# Patient Record
Sex: FEMALE | Race: WHITE | NOT HISPANIC OR LATINO | Employment: OTHER | URBAN - METROPOLITAN AREA
[De-identification: names, ages, dates, MRNs, and addresses within clinical notes are randomized per-mention and may not be internally consistent; named-entity substitution may affect disease eponyms.]

---

## 2017-08-21 NOTE — PATIENT DISCUSSION
Pt was here today afor a Second opinion. [place] discussed her need for cataract surgery to improve her vision at this time. She will follow-up with Dr. Sharonda Anthony. Ricardo Tatuming she returns to [facility], I have recommended [address].

## 2022-01-27 ENCOUNTER — NEW PATIENT (OUTPATIENT)
Dept: URBAN - METROPOLITAN AREA CLINIC 38 | Facility: CLINIC | Age: 87
End: 2022-01-27

## 2022-01-27 DIAGNOSIS — Z96.1: ICD-10-CM

## 2022-01-27 DIAGNOSIS — H52.7: ICD-10-CM

## 2022-01-27 DIAGNOSIS — H02.88B: ICD-10-CM

## 2022-01-27 DIAGNOSIS — H04.123: ICD-10-CM

## 2022-01-27 DIAGNOSIS — H43.813: ICD-10-CM

## 2022-01-27 DIAGNOSIS — H02.88A: ICD-10-CM

## 2022-01-27 PROCEDURE — 92015 DETERMINE REFRACTIVE STATE: CPT

## 2022-01-27 PROCEDURE — 99204 OFFICE O/P NEW MOD 45 MIN: CPT

## 2022-01-27 RX ORDER — OLOPATADINE HYDROCHLORIDE 2 MG/ML: 1 SOLUTION OPHTHALMIC ONCE A DAY

## 2022-01-27 ASSESSMENT — VISUAL ACUITY
OD_CC: J1
OU_CC: J1+
OD_CC: 20/40+1
OD_SC: J3
OU_SC: 20/25-2
OU_CC: 20/25
OU_SC: J3
OD_SC: 20/25+2

## 2022-01-27 ASSESSMENT — TONOMETRY
OD_IOP_MMHG: 20
OS_IOP_MMHG: 19

## 2022-01-27 NOTE — PATIENT DISCUSSION
Since she was born. Corrected with surgery. Advised to wear polycarbonate lenses full time. Will get records release.

## 2023-01-30 ENCOUNTER — ESTABLISHED PATIENT (OUTPATIENT)
Dept: URBAN - METROPOLITAN AREA CLINIC 38 | Facility: CLINIC | Age: 88
End: 2023-01-30

## 2023-01-30 DIAGNOSIS — H53.032: ICD-10-CM

## 2023-01-30 DIAGNOSIS — H04.123: ICD-10-CM

## 2023-01-30 DIAGNOSIS — Z96.1: ICD-10-CM

## 2023-01-30 DIAGNOSIS — H43.813: ICD-10-CM

## 2023-01-30 DIAGNOSIS — H02.88B: ICD-10-CM

## 2023-01-30 DIAGNOSIS — H02.88A: ICD-10-CM

## 2023-01-30 PROCEDURE — 92014 COMPRE OPH EXAM EST PT 1/>: CPT

## 2023-01-30 ASSESSMENT — TONOMETRY
OD_IOP_MMHG: 16
OS_IOP_MMHG: 15

## 2023-01-30 ASSESSMENT — VISUAL ACUITY
OU_CC: J1+
OU_CC: 20/25+1
OD_CC: 20/25+1
OD_CC: J1+

## 2024-01-30 ENCOUNTER — COMPREHENSIVE EXAM (OUTPATIENT)
Dept: URBAN - METROPOLITAN AREA CLINIC 38 | Facility: CLINIC | Age: 89
End: 2024-01-30

## 2024-01-30 DIAGNOSIS — H04.123: ICD-10-CM

## 2024-01-30 DIAGNOSIS — H02.88B: ICD-10-CM

## 2024-01-30 DIAGNOSIS — Z96.1: ICD-10-CM

## 2024-01-30 DIAGNOSIS — H52.7: ICD-10-CM

## 2024-01-30 DIAGNOSIS — H35.3131: ICD-10-CM

## 2024-01-30 DIAGNOSIS — H43.813: ICD-10-CM

## 2024-01-30 DIAGNOSIS — H02.88A: ICD-10-CM

## 2024-01-30 DIAGNOSIS — H53.032: ICD-10-CM

## 2024-01-30 PROCEDURE — 92134 CPTRZ OPH DX IMG PST SGM RTA: CPT

## 2024-01-30 PROCEDURE — 92015 DETERMINE REFRACTIVE STATE: CPT

## 2024-01-30 PROCEDURE — 92014 COMPRE OPH EXAM EST PT 1/>: CPT

## 2024-01-30 ASSESSMENT — VISUAL ACUITY
OD_CC: J2
OU_CC: J2
OD_CC: 20/30
OU_CC: 20/30

## 2024-01-30 ASSESSMENT — TONOMETRY
OD_IOP_MMHG: 15
OS_IOP_MMHG: 15

## 2024-02-08 ENCOUNTER — EMERGENCY VISIT (OUTPATIENT)
Dept: URBAN - METROPOLITAN AREA CLINIC 38 | Facility: CLINIC | Age: 89
End: 2024-02-08

## 2024-02-08 DIAGNOSIS — H53.2: ICD-10-CM

## 2024-02-08 DIAGNOSIS — H53.032: ICD-10-CM

## 2024-02-08 DIAGNOSIS — H04.123: ICD-10-CM

## 2024-02-08 DIAGNOSIS — H43.813: ICD-10-CM

## 2024-02-08 DIAGNOSIS — H35.3131: ICD-10-CM

## 2024-02-08 DIAGNOSIS — Z96.1: ICD-10-CM

## 2024-02-08 DIAGNOSIS — H02.88A: ICD-10-CM

## 2024-02-08 DIAGNOSIS — H35.371: ICD-10-CM

## 2024-02-08 DIAGNOSIS — H02.88B: ICD-10-CM

## 2024-02-08 PROCEDURE — 99213 OFFICE O/P EST LOW 20 MIN: CPT

## 2024-02-08 ASSESSMENT — VISUAL ACUITY
OD_CC: 20/50
OU_CC: 20/50

## 2024-02-08 ASSESSMENT — TONOMETRY
OS_IOP_MMHG: 15
OD_IOP_MMHG: 16

## 2024-02-27 ENCOUNTER — CONSULTATION/EVALUATION (OUTPATIENT)
Dept: URBAN - METROPOLITAN AREA CLINIC 39 | Facility: CLINIC | Age: 89
End: 2024-02-27

## 2024-02-27 DIAGNOSIS — H53.032: ICD-10-CM

## 2024-02-27 DIAGNOSIS — H35.30: ICD-10-CM

## 2024-02-27 DIAGNOSIS — H35.721: ICD-10-CM

## 2024-02-27 DIAGNOSIS — Z96.1: ICD-10-CM

## 2024-02-27 DIAGNOSIS — H43.813: ICD-10-CM

## 2024-02-27 DIAGNOSIS — H35.371: ICD-10-CM

## 2024-02-27 DIAGNOSIS — H35.732: ICD-10-CM

## 2024-02-27 DIAGNOSIS — H35.3112: ICD-10-CM

## 2024-02-27 DIAGNOSIS — H35.3221: ICD-10-CM

## 2024-02-27 DIAGNOSIS — H53.2: ICD-10-CM

## 2024-02-27 DIAGNOSIS — H04.123: ICD-10-CM

## 2024-02-27 DIAGNOSIS — H33.101: ICD-10-CM

## 2024-02-27 DIAGNOSIS — H35.363: ICD-10-CM

## 2024-02-27 PROCEDURE — 99214 OFFICE O/P EST MOD 30 MIN: CPT

## 2024-02-27 PROCEDURE — 92250 FUNDUS PHOTOGRAPHY W/I&R: CPT

## 2024-02-27 PROCEDURE — 92235 FLUORESCEIN ANGRPH MLTIFRAME: CPT

## 2024-02-27 PROCEDURE — 76512 OPH US DX B-SCAN: CPT

## 2024-02-27 ASSESSMENT — TONOMETRY
OS_IOP_MMHG: 9
OD_IOP_MMHG: 10

## 2024-02-27 ASSESSMENT — VISUAL ACUITY: OD_CC: 20/25

## 2024-08-27 ENCOUNTER — COMPREHENSIVE EXAM (OUTPATIENT)
Dept: URBAN - METROPOLITAN AREA CLINIC 39 | Facility: CLINIC | Age: 89
End: 2024-08-27

## 2024-08-27 DIAGNOSIS — H35.732: ICD-10-CM

## 2024-08-27 DIAGNOSIS — H35.3221: ICD-10-CM

## 2024-08-27 DIAGNOSIS — H35.363: ICD-10-CM

## 2024-08-27 DIAGNOSIS — H35.371: ICD-10-CM

## 2024-08-27 DIAGNOSIS — H35.3112: ICD-10-CM

## 2024-08-27 DIAGNOSIS — H35.721: ICD-10-CM

## 2024-08-27 DIAGNOSIS — H33.101: ICD-10-CM

## 2024-08-27 DIAGNOSIS — H35.30: ICD-10-CM

## 2024-08-27 PROCEDURE — 92242 FLUORESCEIN&ICG ANGIOGRAPHY: CPT

## 2024-08-27 PROCEDURE — 92134 CPTRZ OPH DX IMG PST SGM RTA: CPT

## 2024-08-27 PROCEDURE — 99214 OFFICE O/P EST MOD 30 MIN: CPT

## 2024-08-27 ASSESSMENT — VISUAL ACUITY
OS_CC: CF 3FT
OD_PH: 20/30-2
OD_CC: 20/60

## 2024-08-27 ASSESSMENT — TONOMETRY
OD_IOP_MMHG: 16
OS_IOP_MMHG: 13

## 2025-02-06 ENCOUNTER — COMPREHENSIVE EXAM (OUTPATIENT)
Age: OVER 89
End: 2025-02-06

## 2025-02-06 DIAGNOSIS — H35.363: ICD-10-CM

## 2025-02-06 DIAGNOSIS — H02.88B: ICD-10-CM

## 2025-02-06 DIAGNOSIS — Z96.1: ICD-10-CM

## 2025-02-06 DIAGNOSIS — H04.123: ICD-10-CM

## 2025-02-06 DIAGNOSIS — H35.30: ICD-10-CM

## 2025-02-06 DIAGNOSIS — H02.88A: ICD-10-CM

## 2025-02-06 DIAGNOSIS — H35.732: ICD-10-CM

## 2025-02-06 DIAGNOSIS — H35.3221: ICD-10-CM

## 2025-02-06 DIAGNOSIS — H43.813: ICD-10-CM

## 2025-02-06 DIAGNOSIS — H35.3112: ICD-10-CM

## 2025-02-06 DIAGNOSIS — H35.371: ICD-10-CM

## 2025-02-06 DIAGNOSIS — H53.032: ICD-10-CM

## 2025-02-06 DIAGNOSIS — H33.101: ICD-10-CM

## 2025-02-06 DIAGNOSIS — H52.7: ICD-10-CM

## 2025-02-06 DIAGNOSIS — H35.721: ICD-10-CM

## 2025-02-06 PROCEDURE — 92015 DETERMINE REFRACTIVE STATE: CPT

## 2025-02-06 PROCEDURE — 92014 COMPRE OPH EXAM EST PT 1/>: CPT

## 2025-08-06 ENCOUNTER — COMPREHENSIVE EXAM (OUTPATIENT)
Age: OVER 89
End: 2025-08-06

## 2025-08-06 DIAGNOSIS — H35.3112: ICD-10-CM

## 2025-08-06 DIAGNOSIS — H52.7: ICD-10-CM

## 2025-08-06 DIAGNOSIS — H35.732: ICD-10-CM

## 2025-08-06 DIAGNOSIS — H35.721: ICD-10-CM

## 2025-08-06 DIAGNOSIS — H02.88A: ICD-10-CM

## 2025-08-06 DIAGNOSIS — H33.101: ICD-10-CM

## 2025-08-06 DIAGNOSIS — H35.363: ICD-10-CM

## 2025-08-06 DIAGNOSIS — H53.032: ICD-10-CM

## 2025-08-06 DIAGNOSIS — Z96.1: ICD-10-CM

## 2025-08-06 DIAGNOSIS — H35.3221: ICD-10-CM

## 2025-08-06 DIAGNOSIS — H04.123: ICD-10-CM

## 2025-08-06 DIAGNOSIS — H35.30: ICD-10-CM

## 2025-08-06 DIAGNOSIS — H02.88B: ICD-10-CM

## 2025-08-06 DIAGNOSIS — H35.371: ICD-10-CM

## 2025-08-06 DIAGNOSIS — H43.813: ICD-10-CM

## 2025-08-06 PROCEDURE — 92014 COMPRE OPH EXAM EST PT 1/>: CPT

## 2025-08-06 PROCEDURE — 92015 DETERMINE REFRACTIVE STATE: CPT
